# Patient Record
Sex: MALE
[De-identification: names, ages, dates, MRNs, and addresses within clinical notes are randomized per-mention and may not be internally consistent; named-entity substitution may affect disease eponyms.]

---

## 2019-08-16 ENCOUNTER — APPOINTMENT (OUTPATIENT)
Dept: PULMONOLOGY | Facility: CLINIC | Age: 19
End: 2019-08-16

## 2020-05-28 ENCOUNTER — APPOINTMENT (OUTPATIENT)
Dept: PULMONOLOGY | Facility: CLINIC | Age: 20
End: 2020-05-28
Payer: COMMERCIAL

## 2020-05-28 DIAGNOSIS — Z78.9 OTHER SPECIFIED HEALTH STATUS: ICD-10-CM

## 2020-05-28 PROCEDURE — 99204 OFFICE O/P NEW MOD 45 MIN: CPT | Mod: 95

## 2020-05-28 RX ORDER — PREDNISONE 20 MG/1
20 TABLET ORAL
Qty: 10 | Refills: 0 | Status: DISCONTINUED | COMMUNITY
Start: 2020-02-04

## 2020-05-28 RX ORDER — OSELTAMIVIR PHOSPHATE 75 MG/1
75 CAPSULE ORAL
Qty: 10 | Refills: 0 | Status: DISCONTINUED | COMMUNITY
Start: 2020-02-04

## 2020-05-28 RX ORDER — IBUPROFEN 600 MG/1
600 TABLET, FILM COATED ORAL
Qty: 21 | Refills: 0 | Status: DISCONTINUED | COMMUNITY
Start: 2020-02-04

## 2020-05-28 NOTE — DISCUSSION/SUMMARY
[FreeTextEntry1] : Initial health maintenance visit\par Post viral flulike illness February 2020\par Question whether this could have been a COVID-19 illness\par \par Recommendations\par Patient will be scheduled for an office appointment visit to undergo complete blood work urine analysis and covert IgG antibody testing\par

## 2020-05-28 NOTE — HISTORY OF PRESENT ILLNESS
[Medical Office: ([facility])___] : at the medical office located in  [Home] : at home, [unfilled] , at the time of the visit. [Verbal consent obtained from patient] : the patient, [unfilled] [TextBox_4] : This visit was provided via telehealth using real-time 2-way audio visual technology. The patient, ESTHER TRISTAN , was located at home, [address]\[place] [address]  at the time of the visit.\par The provider, Dr. Michael Olmos, was located at office [address], [place] at the time of the visit. \par \par  The patient, Mr. ESTHER TRISTAN  and Physician Michael Olmos DO [facility] participated in the telehealth encounter.\par \par Verbal consent obtained by  from patient\par \par Patient had an initial health maintenance visit\par It was done by telemedicine to first assess his status\par He states he is healthy\par He has no active viral symptomatology\par He reports that he had a flu or flulike illness this past February and was treated with prednisone 20 mg x 5 days with Tamiflu and ibuprofen\par He now questions whether that could have been a COVID virus\par Although descriptively he did not have other symptomatology that we relate to the COVID virus but understand that 1 could be asymptomatic through severe and we did discuss those symptoms\par Have some seasonal allergies\par No respiratory symptoms\par No history of asthma\par No prior history of pneumonia tuberculosis bronchitis\par 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

## 2020-05-28 NOTE — PHYSICAL EXAM
[No Acute Distress] : no acute distress [No Resp Distress] : no resp distress [Normal Color/ Pigmentation] : normal color/ pigmentation [Oriented x3] : oriented x3 [Normal Affect] : normal affect [Normal Mood] : normal mood

## 2020-08-17 ENCOUNTER — NON-APPOINTMENT (OUTPATIENT)
Age: 20
End: 2020-08-17

## 2020-08-17 ENCOUNTER — APPOINTMENT (OUTPATIENT)
Dept: PULMONOLOGY | Facility: CLINIC | Age: 20
End: 2020-08-17
Payer: COMMERCIAL

## 2020-08-17 VITALS
HEIGHT: 69.5 IN | DIASTOLIC BLOOD PRESSURE: 83 MMHG | TEMPERATURE: 98.1 F | SYSTOLIC BLOOD PRESSURE: 126 MMHG | RESPIRATION RATE: 15 BRPM | HEART RATE: 71 BPM | WEIGHT: 228 LBS | BODY MASS INDEX: 33.01 KG/M2 | OXYGEN SATURATION: 97 %

## 2020-08-17 DIAGNOSIS — Z20.828 CONTACT WITH AND (SUSPECTED) EXPOSURE TO OTHER VIRAL COMMUNICABLE DISEASES: ICD-10-CM

## 2020-08-17 LAB
ALBUMIN: 10
BILIRUB UR QL STRIP: NEGATIVE
CLARITY UR: CLEAR
COLLECTION METHOD: NORMAL
CREATININE: 200
GLUCOSE UR-MCNC: NEGATIVE
HCG UR QL: 0.2 EU/DL
HGB UR QL STRIP.AUTO: NEGATIVE
KETONES UR-MCNC: NEGATIVE
LEUKOCYTE ESTERASE UR QL STRIP: NEGATIVE
MICROALBUMIN/CREAT UR TEST STR-RTO: <30
NITRITE UR QL STRIP: NEGATIVE
PH UR STRIP: 6.5
PROT UR STRIP-MCNC: NEGATIVE
SP GR UR STRIP: 1.02

## 2020-08-17 PROCEDURE — 81003 URINALYSIS AUTO W/O SCOPE: CPT | Mod: QW

## 2020-08-17 PROCEDURE — 93000 ELECTROCARDIOGRAM COMPLETE: CPT

## 2020-08-17 PROCEDURE — 82044 UR ALBUMIN SEMIQUANTITATIVE: CPT | Mod: QW

## 2020-08-17 PROCEDURE — 99213 OFFICE O/P EST LOW 20 MIN: CPT | Mod: 25

## 2020-08-17 PROCEDURE — 36415 COLL VENOUS BLD VENIPUNCTURE: CPT

## 2020-08-17 NOTE — PHYSICAL EXAM
[No Acute Distress] : no acute distress [No Resp Distress] : no resp distress [Normal Color/ Pigmentation] : normal color/ pigmentation [Oriented x3] : oriented x3 [Normal Mood] : normal mood [Normal Affect] : normal affect

## 2020-08-18 LAB
25(OH)D3 SERPL-MCNC: 14.2 NG/ML
ALBUMIN SERPL ELPH-MCNC: 4.8 G/DL
ALP BLD-CCNC: 76 U/L
ALT SERPL-CCNC: 22 U/L
ANION GAP SERPL CALC-SCNC: 14 MMOL/L
AST SERPL-CCNC: 22 U/L
BASOPHILS # BLD AUTO: 0.08 K/UL
BASOPHILS NFR BLD AUTO: 1.2 %
BILIRUB SERPL-MCNC: 0.3 MG/DL
BUN SERPL-MCNC: 12 MG/DL
CALCIUM SERPL-MCNC: 10.2 MG/DL
CHLORIDE SERPL-SCNC: 103 MMOL/L
CHOLEST SERPL-MCNC: 152 MG/DL
CHOLEST/HDLC SERPL: 2.9 RATIO
CO2 SERPL-SCNC: 22 MMOL/L
CREAT SERPL-MCNC: 1.07 MG/DL
EOSINOPHIL # BLD AUTO: 0.27 K/UL
EOSINOPHIL NFR BLD AUTO: 3.9 %
ESTIMATED AVERAGE GLUCOSE: 114 MG/DL
GLUCOSE SERPL-MCNC: 86 MG/DL
HBA1C MFR BLD HPLC: 5.6 %
HCT VFR BLD CALC: 44.1 %
HDLC SERPL-MCNC: 52 MG/DL
HGB BLD-MCNC: 14.6 G/DL
IMM GRANULOCYTES NFR BLD AUTO: 0.3 %
LDLC SERPL CALC-MCNC: 85 MG/DL
LYMPHOCYTES # BLD AUTO: 2.05 K/UL
LYMPHOCYTES NFR BLD AUTO: 29.6 %
MAN DIFF?: NORMAL
MCHC RBC-ENTMCNC: 28.9 PG
MCHC RBC-ENTMCNC: 33.1 GM/DL
MCV RBC AUTO: 87.2 FL
MONOCYTES # BLD AUTO: 0.49 K/UL
MONOCYTES NFR BLD AUTO: 7.1 %
NEUTROPHILS # BLD AUTO: 4.02 K/UL
NEUTROPHILS NFR BLD AUTO: 57.9 %
PLATELET # BLD AUTO: 342 K/UL
POTASSIUM SERPL-SCNC: 3.8 MMOL/L
PROT SERPL-MCNC: 7.5 G/DL
RBC # BLD: 5.06 M/UL
RBC # FLD: 12.6 %
SARS-COV-2 IGG SERPL IA-ACNC: <0.1 INDEX
SARS-COV-2 IGG SERPL QL IA: NEGATIVE
SODIUM SERPL-SCNC: 140 MMOL/L
TRIGL SERPL-MCNC: 70 MG/DL
TSH SERPL-ACNC: 2.2 UIU/ML
WBC # FLD AUTO: 6.93 K/UL

## 2020-08-18 RX ORDER — ERGOCALCIFEROL 1.25 MG/1
1.25 MG CAPSULE, LIQUID FILLED ORAL
Qty: 8 | Refills: 1 | Status: ACTIVE | COMMUNITY
Start: 2020-08-18 | End: 1900-01-01

## 2020-08-18 NOTE — DISCUSSION/SUMMARY
[FreeTextEntry1] : Initial health maintenance visit\par Post viral flulike illness February 2020\par Question whether this could have been a COVID-19 illness\par Vitamin D insufficiency-vitamin D supplementation 50,000 units weekly x8 with refill and then 2000 units daily\par Recommendations\par Patient will be scheduled for an office appointment visit to undergo complete blood work urine analysis and COVID  IgG antibody testing\par

## 2020-08-18 NOTE — PROCEDURE
[FreeTextEntry1] : EKG NSR \par  UA  noted\par  blood  draw\par Data review August 18, 2020\par A1c 5.6\par CBC normal\par Average glucose 114\par CBC normal range\par TSH 2.20\par Serum electrolytes liver function testing normal\par COVID-19 IgG antibody negative\par Very low vitamin D 14.2\par Lipid profile cholesterol 152 HDL 52 LDL 85 ratio 2.9\par Triglycerides 70 normal range

## 2020-08-18 NOTE — HISTORY OF PRESENT ILLNESS
[TextBox_4] : Patient had an initial health maintenance visit\par It was done by telemedicine to first assess his status\par He states he is healthy\par He has no active viral symptomatology\par He reports that he had a flu or flulike illness this past February and was treated with prednisone 20 mg x 5 days with Tamiflu and ibuprofen\par He now questions whether that could have been a COVID virus\par Although descriptively he did not have other symptomatology that we relate to the COVID virus but understand that 1 could be asymptomatic through severe and we did discuss those symptoms\par Have some seasonal allergies\par No respiratory symptoms\par No history of asthma\par No prior history of pneumonia tuberculosis bronchitis\par 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

## 2021-04-21 ENCOUNTER — APPOINTMENT (OUTPATIENT)
Dept: PULMONOLOGY | Facility: CLINIC | Age: 21
End: 2021-04-21
Payer: COMMERCIAL

## 2021-04-21 VITALS
TEMPERATURE: 98.2 F | DIASTOLIC BLOOD PRESSURE: 76 MMHG | HEART RATE: 95 BPM | OXYGEN SATURATION: 97 % | SYSTOLIC BLOOD PRESSURE: 121 MMHG

## 2021-04-21 DIAGNOSIS — S76.211A STRAIN OF ADDUCTOR MUSCLE, FASCIA AND TENDON OF RIGHT THIGH, INITIAL ENCOUNTER: ICD-10-CM

## 2021-04-21 LAB
BILIRUB UR QL STRIP: NORMAL
CLARITY UR: CLEAR
COLLECTION METHOD: NORMAL
GLUCOSE UR-MCNC: NORMAL
HCG UR QL: 1 EU/DL
HGB UR QL STRIP.AUTO: NORMAL
KETONES UR-MCNC: NORMAL
LEUKOCYTE ESTERASE UR QL STRIP: NORMAL
NITRITE UR QL STRIP: NORMAL
PH UR STRIP: 7.5
PROT UR STRIP-MCNC: NORMAL
SP GR UR STRIP: 1.02

## 2021-04-21 PROCEDURE — 99213 OFFICE O/P EST LOW 20 MIN: CPT | Mod: 25

## 2021-04-21 PROCEDURE — 99072 ADDL SUPL MATRL&STAF TM PHE: CPT

## 2021-04-21 PROCEDURE — 81003 URINALYSIS AUTO W/O SCOPE: CPT | Mod: NC,QW

## 2021-04-21 NOTE — HISTORY OF PRESENT ILLNESS
[TextBox_4] : 22 y/o\par  right  groin pain x 1  1/2 weeks\par  some right  testicular tenderness\par  no fever chills  sweats \par  Sexually  active \par  No discharge\par  Urgent care UTI  STS per  patient reported negative\par  US ABD groin completed LUTHER and report pending

## 2021-04-21 NOTE — PHYSICAL EXAM
[No Acute Distress] : no acute distress [Normal Oropharynx] : normal oropharynx [Normal Appearance] : normal appearance [Supple] : supple [No JVD] : no jvd [No Resp Distress] : no resp distress [No Acc Muscle Use] : no acc muscle use [Clear to Auscultation Bilaterally] : clear to auscultation bilaterally [Benign] : benign [Not Tender] : not tender [No Masses] : no masses [Soft] : soft [No Hernias] : no hernias [Normal Bowel Sounds] : normal bowel sounds [Normal Gait] : normal gait [Normal Color/ Pigmentation] : normal color/ pigmentation [No Focal Deficits] : no focal deficits [Oriented x3] : oriented x3 [Normal Mood] : normal mood [Normal Affect] : normal affect

## 2021-04-21 NOTE — DISCUSSION/SUMMARY
[FreeTextEntry1] : R groin pain\par testicular pain\par  r/o hernia\par  no  evidence infection\par  ? muscle R groin discomfort\par  evaulation Dana Mcfarlane MD

## 2022-01-14 ENCOUNTER — LABORATORY RESULT (OUTPATIENT)
Age: 22
End: 2022-01-14

## 2022-01-14 ENCOUNTER — NON-APPOINTMENT (OUTPATIENT)
Age: 22
End: 2022-01-14

## 2022-01-14 ENCOUNTER — APPOINTMENT (OUTPATIENT)
Dept: PULMONOLOGY | Facility: CLINIC | Age: 22
End: 2022-01-14
Payer: COMMERCIAL

## 2022-01-14 VITALS
DIASTOLIC BLOOD PRESSURE: 81 MMHG | OXYGEN SATURATION: 100 % | HEART RATE: 70 BPM | SYSTOLIC BLOOD PRESSURE: 129 MMHG | TEMPERATURE: 97.3 F

## 2022-01-14 DIAGNOSIS — U07.1 COVID-19: ICD-10-CM

## 2022-01-14 DIAGNOSIS — Z00.00 ENCOUNTER FOR GENERAL ADULT MEDICAL EXAMINATION W/OUT ABNORMAL FINDINGS: ICD-10-CM

## 2022-01-14 DIAGNOSIS — Z11.59 ENCOUNTER FOR SCREENING FOR OTHER VIRAL DISEASES: ICD-10-CM

## 2022-01-14 DIAGNOSIS — E55.9 VITAMIN D DEFICIENCY, UNSPECIFIED: ICD-10-CM

## 2022-01-14 DIAGNOSIS — E03.9 HYPOTHYROIDISM, UNSPECIFIED: ICD-10-CM

## 2022-01-14 LAB
ALBUMIN: 80
BASOPHILS # BLD AUTO: 0.06 K/UL
BASOPHILS NFR BLD AUTO: 1 %
BILIRUB UR QL STRIP: NORMAL
CLARITY UR: CLEAR
COLLECTION METHOD: NORMAL
COVID-19 NUCLEOCAPSID  GAM ANTIBODY INTERPRETATION: POSITIVE
CREATININE: 300
EOSINOPHIL # BLD AUTO: 0.07 K/UL
EOSINOPHIL NFR BLD AUTO: 1.2 %
GLUCOSE UR-MCNC: NORMAL
HCG UR QL: 0.2 EU/DL
HCT VFR BLD CALC: 46.1 %
HGB BLD-MCNC: 15.6 G/DL
HGB UR QL STRIP.AUTO: NORMAL
IMM GRANULOCYTES NFR BLD AUTO: 0.3 %
KETONES UR-MCNC: NORMAL
LEUKOCYTE ESTERASE UR QL STRIP: NORMAL
LYMPHOCYTES # BLD AUTO: 2.49 K/UL
LYMPHOCYTES NFR BLD AUTO: 43.2 %
MAN DIFF?: NORMAL
MCHC RBC-ENTMCNC: 30.1 PG
MCHC RBC-ENTMCNC: 33.8 GM/DL
MCV RBC AUTO: 89 FL
MICROALBUMIN/CREAT UR TEST STR-RTO: NORMAL
MONOCYTES # BLD AUTO: 0.33 K/UL
MONOCYTES NFR BLD AUTO: 5.7 %
NEUTROPHILS # BLD AUTO: 2.79 K/UL
NEUTROPHILS NFR BLD AUTO: 48.6 %
NITRITE UR QL STRIP: NORMAL
PH UR STRIP: 6.5
PLATELET # BLD AUTO: 316 K/UL
PROT UR STRIP-MCNC: 30
PSA SERPL-MCNC: 1.25 NG/ML
RBC # BLD: 5.18 M/UL
RBC # FLD: 12.9 %
SARS-COV-2 AB SERPL QL IA: 16 INDEX
SP GR UR STRIP: 1.03
WBC # FLD AUTO: 5.76 K/UL

## 2022-01-14 PROCEDURE — 36415 COLL VENOUS BLD VENIPUNCTURE: CPT

## 2022-01-14 PROCEDURE — 93000 ELECTROCARDIOGRAM COMPLETE: CPT

## 2022-01-14 PROCEDURE — 82044 UR ALBUMIN SEMIQUANTITATIVE: CPT | Mod: QW

## 2022-01-14 PROCEDURE — 99395 PREV VISIT EST AGE 18-39: CPT | Mod: 25

## 2022-01-14 PROCEDURE — 81003 URINALYSIS AUTO W/O SCOPE: CPT | Mod: QW

## 2022-01-14 NOTE — HISTORY OF PRESENT ILLNESS
[Never] : never [TextBox_4] : Well visit\par No active complaints\par He does request STD panel\par No known exposure risks but he is involved in a relationship\par No history of COVID-19 infection\par He states he did receive COVID-19 protocol vaccination\par Remains off medications\par Non-smoker\par

## 2022-01-14 NOTE — PHYSICAL EXAM
[Normal Oropharynx] : normal oropharynx [Normal Appearance] : normal appearance [Supple] : supple [No Neck Mass] : no neck mass [No JVD] : no jvd [Normal Rate/Rhythm] : normal rate/rhythm [Normal S1, S2] : normal s1, s2 [No Murmurs] : no murmurs [No Resp Distress] : no resp distress [No Acc Muscle Use] : no acc muscle use [Normal Palpation] : normal palpation [Normal Rhythm and Effort] : normal rhythm and effort [Benign] : benign [Not Tender] : not tender [Soft] : soft [Normal Bowel Sounds] : normal bowel sounds [No Clubbing] : no clubbing [No Cyanosis] : no cyanosis [No Edema] : no edema [FROM] : FROM [Normal Color/ Pigmentation] : normal color/ pigmentation [No Focal Deficits] : no focal deficits [Oriented x3] : oriented x3 [Normal Affect] : normal affect

## 2022-01-14 NOTE — DISCUSSION/SUMMARY
[FreeTextEntry1] : Well Visit\par  exercise\par COVID precautions \par bloods pending\par  recommend COVID booster\par STD pending as requested

## 2022-01-15 PROBLEM — E03.9 BORDERLINE HYPOTHYROIDISM: Status: ACTIVE | Noted: 2022-01-15

## 2022-01-15 PROBLEM — U07.1 COVID-19 VIRUS DETECTED: Status: ACTIVE | Noted: 2022-01-15

## 2022-01-15 LAB
25(OH)D3 SERPL-MCNC: 31.7 NG/ML
ALBUMIN SERPL ELPH-MCNC: 4.9 G/DL
ALP BLD-CCNC: 63 U/L
ALT SERPL-CCNC: 12 U/L
ANION GAP SERPL CALC-SCNC: 14 MMOL/L
AST SERPL-CCNC: 19 U/L
BILIRUB SERPL-MCNC: 0.6 MG/DL
BUN SERPL-MCNC: 15 MG/DL
CALCIUM SERPL-MCNC: 10.4 MG/DL
CHLORIDE SERPL-SCNC: 101 MMOL/L
CHOLEST SERPL-MCNC: 171 MG/DL
CO2 SERPL-SCNC: 25 MMOL/L
CREAT SERPL-MCNC: 1.14 MG/DL
ESTIMATED AVERAGE GLUCOSE: 111 MG/DL
GLUCOSE SERPL-MCNC: 86 MG/DL
HBA1C MFR BLD HPLC: 5.5 %
HDLC SERPL-MCNC: 76 MG/DL
HIV1+2 AB SPEC QL IA.RAPID: NONREACTIVE
HSV 1+2 IGG SER IA-IMP: NEGATIVE
HSV 1+2 IGG SER IA-IMP: NEGATIVE
HSV1 IGG SER QL: 0.11 INDEX
HSV2 IGG SER QL: 0.1 INDEX
LDLC SERPL CALC-MCNC: 84 MG/DL
NONHDLC SERPL-MCNC: 95 MG/DL
POTASSIUM SERPL-SCNC: 3.8 MMOL/L
PROT SERPL-MCNC: 8.1 G/DL
SODIUM SERPL-SCNC: 140 MMOL/L
T4 SERPL-MCNC: 7.4 UG/DL
TRIGL SERPL-MCNC: 57 MG/DL
TSH SERPL-ACNC: 4.65 UIU/ML

## 2022-01-16 LAB
C TRACH RRNA SPEC QL NAA+PROBE: NOT DETECTED
N GONORRHOEA RRNA SPEC QL NAA+PROBE: NOT DETECTED
SOURCE AMPLIFICATION: NORMAL

## 2022-01-19 LAB
RPR SER-TITR: NORMAL
THYROGLOB AB SERPL-ACNC: <20 IU/ML
THYROPEROXIDASE AB SERPL IA-ACNC: <10 IU/ML

## 2022-04-11 PROBLEM — Z11.59 SCREENING FOR VIRAL DISEASE: Status: ACTIVE | Noted: 2022-01-14

## 2023-08-03 ENCOUNTER — APPOINTMENT (OUTPATIENT)
Dept: PULMONOLOGY | Facility: CLINIC | Age: 23
End: 2023-08-03

## 2024-04-23 ENCOUNTER — APPOINTMENT (OUTPATIENT)
Dept: PULMONOLOGY | Facility: CLINIC | Age: 24
End: 2024-04-23

## 2025-06-23 NOTE — HISTORY OF PRESENT ILLNESS
[Never] : never [TextBox_4] : Well visit\par No active complaints\par He does request STD panel\par No known exposure risks but he is involved in a relationship\par No history of COVID-19 infection\par He states he did receive COVID-19 protocol vaccination\par Remains off medications\par Non-smoker\par  denies pain/discomfort (Rating = 0)